# Patient Record
Sex: FEMALE | Race: BLACK OR AFRICAN AMERICAN | Employment: UNEMPLOYED | RURAL
[De-identification: names, ages, dates, MRNs, and addresses within clinical notes are randomized per-mention and may not be internally consistent; named-entity substitution may affect disease eponyms.]

---
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
## 2023-04-19 ENCOUNTER — LAB ONLY (OUTPATIENT)
Dept: FAMILY MEDICINE CLINIC | Age: 18
End: 2023-04-19
Payer: COMMERCIAL

## 2023-04-19 DIAGNOSIS — R74.8 ELEVATED LIVER ENZYMES: ICD-10-CM

## 2023-04-19 DIAGNOSIS — R17 ELEVATED BILIRUBIN: ICD-10-CM

## 2023-04-19 DIAGNOSIS — R79.89 ABNORMAL CBC: ICD-10-CM

## 2023-04-19 PROCEDURE — 36415 COLL VENOUS BLD VENIPUNCTURE: CPT | Performed by: NURSE PRACTITIONER

## 2023-04-19 NOTE — PROGRESS NOTES
Pt presents to clinic for lab work, denies sick sx today. Labs drawn from [facility] per Corie's orders, 1 attempt, tolerated well, 1 SST and 1 lavender.

## 2023-04-20 LAB
ALBUMIN SERPL-MCNC: 3.7 G/DL (ref 3.5–5)
ALBUMIN/GLOB SERPL: 1.1 (ref 1.1–2.2)
ALP SERPL-CCNC: 116 U/L (ref 45–117)
ALT SERPL-CCNC: 21 U/L (ref 12–78)
AST SERPL-CCNC: 11 U/L (ref 15–37)
BASOPHILS # BLD: 0 K/UL (ref 0–0.1)
BASOPHILS NFR BLD: 1 % (ref 0–1)
BILIRUB DIRECT SERPL-MCNC: <0.1 MG/DL (ref 0–0.2)
BILIRUB SERPL-MCNC: 0.1 MG/DL (ref 0.2–1)
DIFFERENTIAL METHOD BLD: ABNORMAL
EOSINOPHIL # BLD: 0.3 K/UL (ref 0–0.4)
EOSINOPHIL NFR BLD: 4 % (ref 0–7)
ERYTHROCYTE [DISTWIDTH] IN BLOOD BY AUTOMATED COUNT: 17.6 % (ref 11.5–14.5)
GLOBULIN SER CALC-MCNC: 3.5 G/DL (ref 2–4)
HCT VFR BLD AUTO: 40.4 % (ref 35–47)
HGB BLD-MCNC: 11.7 G/DL (ref 11.5–16)
IMM GRANULOCYTES # BLD AUTO: 0 K/UL (ref 0–0.04)
IMM GRANULOCYTES NFR BLD AUTO: 0 % (ref 0–0.5)
LYMPHOCYTES # BLD: 2.5 K/UL (ref 0.8–3.5)
LYMPHOCYTES NFR BLD: 36 % (ref 12–49)
MCH RBC QN AUTO: 22.3 PG (ref 26–34)
MCHC RBC AUTO-ENTMCNC: 29 G/DL (ref 30–36.5)
MCV RBC AUTO: 77 FL (ref 80–99)
MONOCYTES # BLD: 0.5 K/UL (ref 0–1)
MONOCYTES NFR BLD: 7 % (ref 5–13)
NEUTS SEG # BLD: 3.7 K/UL (ref 1.8–8)
NEUTS SEG NFR BLD: 53 % (ref 32–75)
NRBC # BLD: 0 K/UL (ref 0–0.01)
NRBC BLD-RTO: 0 PER 100 WBC
PLATELET # BLD AUTO: 161 K/UL (ref 150–400)
PROT SERPL-MCNC: 7.2 G/DL (ref 6.4–8.2)
RBC # BLD AUTO: 5.25 M/UL (ref 3.8–5.2)
WBC # BLD AUTO: 6.9 K/UL (ref 3.6–11)

## 2023-04-21 NOTE — PROGRESS NOTES
Patient identified by two patient identifiers, name and date of birth. SW mom Darylene Idol, made aware of results and states understanding at this time.

## 2023-05-20 RX ORDER — IBUPROFEN 800 MG/1
TABLET ORAL
COMMUNITY
Start: 2017-06-16

## 2023-05-20 RX ORDER — MOMETASONE FUROATE 1 MG/G
OINTMENT TOPICAL
COMMUNITY
Start: 2018-07-09

## 2023-05-20 RX ORDER — BUPROPION HYDROCHLORIDE 100 MG/1
TABLET ORAL
COMMUNITY
Start: 2023-04-12

## 2023-05-20 RX ORDER — FLUTICASONE PROPIONATE 50 MCG
SPRAY, SUSPENSION (ML) NASAL
COMMUNITY
Start: 2018-02-16

## 2023-05-20 RX ORDER — NALTREXONE HYDROCHLORIDE 50 MG/1
TABLET, FILM COATED ORAL
COMMUNITY
Start: 2023-04-12

## 2023-05-20 RX ORDER — CETIRIZINE HYDROCHLORIDE 10 MG/1
10 TABLET ORAL
COMMUNITY
Start: 2017-09-11

## 2024-07-09 NOTE — PROGRESS NOTES
Periorbital Skin Units: 0 Subjective:     Brandon Luke is a 13 y.o. female who presents today with the following:  Chief Complaint   Patient presents with    Nasal Congestion    Sore Throat    Generalized Body [place] Early is here with permission from her mother. Presents with nasal congestion and sore throat. Able to eat and drink without difficult . Denies fever and body aches. COMPLIANT WITH MEDICATION:         ROS:  Gen: denies fever, chills, fatigue, weight loss, weight gain  HEENT:denies blurry vision,positive  nasal congestion, sore throat  Resp: denies dypsnea, cough, wheezing  CV: denies murmur  Abd: denies nausea, vomiting, diarrhea, constipation  Neuro: denies numbness/tingling  Endo: denies polyuria, polydipsia, heat/cold intolerance  Heme: no lymphadenopathy    No Known Allergies      Current Outpatient Prescriptions:     fluticasone (FLONASE) 50 mcg/actuation nasal spray, 2 sprays per nostril per day as needed for nasal congestion  Indications: Allergic Rhinitis, Disp: 1 Bottle, Rfl: 11    mometasone (ELOCON) 0.1 % ointment, APPLY TO AFFECTED AREA DAILY, Disp: 45 g, Rfl: 1    ibuprofen (MOTRIN) 800 mg tablet, , Disp: , Rfl:     cetirizine (ZYRTEC) 10 mg tablet, Take 1 Tab by mouth nightly. Indications: ALLERGIC RHINITIS, Disp: 30 Tab, Rfl: 1    albuterol (PROVENTIL HFA, VENTOLIN HFA, PROAIR HFA) 90 mcg/actuation inhaler, Take 1 Puff by inhalation every four (4) hours as needed for Wheezing. Indications: Acute Asthma Attack, Disp: 1 Inhaler, Rfl: 11    calcium carbonate (CHILDREN'S PEPTO) 400 mg chew, Take 1 Tab by mouth three (3) times daily (with meals).  Indications: DYSPEPSIA, Disp: 30 Tab, Rfl: 1    methylphenidate (RITALIN) 20 mg tablet, Take 1 Tab (20 mg total) by mouth two (2) times a dayIndications: ATTENTION-DEFICIT HYPERACTIVITY DISORDER., Disp: 60 Tab, Rfl: 0    albuterol (VENTOLIN HFA) 90 mcg/actuation inhaler, Take 2 Puffs by inhalation every four (4) hours as needed for Wheezing., Disp: 2 Inhaler, Rfl: 4    Nebulizer & Compressor machine, 1 Each by Does Not Apply route daily as needed. , Disp: 1 Each, Rfl: 0    Nebulizer Accessories kit, To include mask and tubing, Disp: 1 Kit, Rfl: 0    Past Medical History:   Diagnosis Date    Contact dermatitis and other eczema, due to unspecified cause     Unspecified asthma(493.90)        No past surgical history on file. History   Smoking Status    Never Smoker   Smokeless Tobacco    Never Used       Social History     Social History    Marital status: SINGLE     Spouse name: N/A    Number of children: N/A    Years of education: N/A     Social History Main Topics    Smoking status: Never Smoker    Smokeless tobacco: Never Used    Alcohol use No    Drug use: No    Sexual activity: Not Asked     Other Topics Concern    None     Social History Narrative       Family History   Problem Relation Age of Onset    No Known Problems Mother     No Known Problems Father          Objective:     Visit Vitals    /75 (BP 1 Location: Right arm, BP Patient Position: Sitting)    Pulse 84    Temp 98.1 °F (36.7 °C) (Temporal)    Resp 18    Ht 5' 7.5\" (1.715 m)    Wt 205 lb (93 kg)    SpO2 99%    BMI 31.63 kg/m2     Body mass index is 31.63 kg/(m^2). General: Alert and oriented. No acute distress. Well nourished  HEENT :  Ears:TMs are normal. Scant fluid behind left  No exudate  Canals are clear. Eyes: pupils equal, round, react to light and accommodation. Nose: patent , pale. Mouth and throat is clear. Neck:supple full range of motion no thyromegaly. Trachea midline,  No significant lymphadenopathy  Lungs[de-identified] clear to auscultation without wheezes, rales, or rhonchi. Heart :RRR, S1 & S2 are normal intensity. No murmur; no gallop  Abdomen: bowel sounds active. No tenderness, guarding, rebound, masses, hepatic or spleen enlargement  Back: no CVA tenderness.   Extremities: without clubbing, cyanosis, or edema  Pulses: radial and femoral Show Forehead Units: Yes Dilution (U/0.1 Cc): 4 pulses are normal  Neuro: HMF intact. Cranial nerves II through XII grossly normal.    Results for orders placed or performed in visit on 18   AMB POC RUBIN INFLUENZA A/B TEST   Result Value Ref Range    VALID INTERNAL CONTROL POC Yes     Influenza A Ag POC Negative Negative Pos/Neg    Influenza B Ag POC Negative Negative Pos/Neg       Results for orders placed or performed in visit on 18   AMB POC RUBIN INFLUENZA A/B TEST   Result Value Ref Range    VALID INTERNAL CONTROL POC Yes     Influenza A Ag POC Negative Negative Pos/Neg    Influenza B Ag POC Negative Negative Pos/Neg       Assessment/ Plan:     Diagnoses and all orders for this visit:    1. Environmental and seasonal allergies    2. Fever, unspecified fever cause  -     AMB POC RUBIN INFLUENZA A/B TEST    3. Sore throat    4. Nasal congestion    Other orders  -     fluticasone (FLONASE) 50 mcg/actuation nasal spray; 2 sprays per nostril per day as needed for nasal congestion  Indications: Allergic Rhinitis         1. Environmental and seasonal allergies    2. Fever, unspecified fever cause    3. Sore throat    4. Nasal congestion        Orders Placed This Encounter    AMB POC RUBIN INFLUENZA A/B TEST    fluticasone (FLONASE) 50 mcg/actuation nasal spray     Si sprays per nostril per day as needed for nasal congestion  Indications: Allergic Rhinitis     Dispense:  1 Bottle     Refill:  11         Verbal and written instructions (see AVS) provided.  Patient expresses understanding of diagnosis and treatment plan. Follow-up Disposition:  Return if symptoms worsen or fail to improve.       RAVEN Dodd-C Incrementing Botox Units: By 0.5 Units Show Right And Left Pupillary Line Units: No Detail Level: Detailed Show Inventory Tab: Hide Post-Care Instructions: Patient instructed to not lie down for 4 hours and limit physical activity for 24 hours. Consent: Written consent obtained. Risks include but not limited to lid/brow ptosis, bruising, swelling, diplopia, temporary effect, incomplete chemical denervation.